# Patient Record
Sex: FEMALE | Race: WHITE | Employment: UNEMPLOYED | URBAN - METROPOLITAN AREA
[De-identification: names, ages, dates, MRNs, and addresses within clinical notes are randomized per-mention and may not be internally consistent; named-entity substitution may affect disease eponyms.]

---

## 2018-02-12 ENCOUNTER — HOSPITAL ENCOUNTER (EMERGENCY)
Facility: CLINIC | Age: 38
Discharge: HOME | End: 2018-02-12
Attending: PHYSICIAN ASSISTANT | Admitting: PHYSICIAN ASSISTANT
Payer: COMMERCIAL

## 2018-02-12 ENCOUNTER — APPOINTMENT (OUTPATIENT)
Dept: GENERAL RADIOLOGY | Facility: CLINIC | Age: 38
End: 2018-02-12
Attending: PHYSICIAN ASSISTANT
Payer: COMMERCIAL

## 2018-02-12 VITALS
OXYGEN SATURATION: 100 % | DIASTOLIC BLOOD PRESSURE: 86 MMHG | WEIGHT: 110 LBS | SYSTOLIC BLOOD PRESSURE: 139 MMHG | BODY MASS INDEX: 18.33 KG/M2 | RESPIRATION RATE: 16 BRPM | HEIGHT: 65 IN | HEART RATE: 110 BPM | TEMPERATURE: 98 F

## 2018-02-12 DIAGNOSIS — M25.511 ACUTE PAIN OF RIGHT SHOULDER: ICD-10-CM

## 2018-02-12 PROCEDURE — 73030 X-RAY EXAM OF SHOULDER: CPT | Mod: RT

## 2018-02-12 PROCEDURE — 99283 EMERGENCY DEPT VISIT LOW MDM: CPT

## 2018-02-12 PROCEDURE — 25000132 ZZH RX MED GY IP 250 OP 250 PS 637: Performed by: PHYSICIAN ASSISTANT

## 2018-02-12 RX ORDER — HYDROCODONE BITARTRATE AND ACETAMINOPHEN 5; 325 MG/1; MG/1
1-2 TABLET ORAL EVERY 4 HOURS PRN
Qty: 8 TABLET | Refills: 0 | Status: SHIPPED | OUTPATIENT
Start: 2018-02-12

## 2018-02-12 RX ORDER — HYDROCODONE BITARTRATE AND ACETAMINOPHEN 5; 325 MG/1; MG/1
2 TABLET ORAL ONCE
Status: COMPLETED | OUTPATIENT
Start: 2018-02-12 | End: 2018-02-12

## 2018-02-12 RX ORDER — DIAZEPAM 5 MG
5 TABLET ORAL ONCE
Status: DISCONTINUED | OUTPATIENT
Start: 2018-02-12 | End: 2018-02-12

## 2018-02-12 RX ADMIN — HYDROCODONE BITARTRATE AND ACETAMINOPHEN 1 TABLET: 5; 325 TABLET ORAL at 17:17

## 2018-02-12 ASSESSMENT — ENCOUNTER SYMPTOMS: ARTHRALGIAS: 1

## 2018-02-12 NOTE — ED AVS SNAPSHOT
Emergency Department    [address] [place] [address]    Phone:  [phone]    Fax:  [phone]                                       Annabella Aguilar   MRN: [id number]    Department:   Emergency Department   Date of Visit:  2/12/2018           After Visit Summary Signature Page     I have received my discharge instructions, and my questions have been answered. I have discussed any challenges I see with this plan with the nurse or doctor.    ..........................................................................................................................................  Patient/Patient Representative Signature      ..........................................................................................................................................  Patient Representative Print Name and Relationship to Patient    ..................................................               ................................................  Date                                            Time    ..........................................................................................................................................  Reviewed by Signature/Title    ...................................................              ..............................................  Date                                                            Time

## 2018-02-12 NOTE — DISCHARGE INSTRUCTIONS
Rest. Ice. Wear sling for comfort, but avoid wearing 24/7 due to risk of frozen shoulder.   Ibuprofen for pain. Norco for more severe pain - avoid driving or drinking alcohol with this medication.   Follow up with orthopedics for further evaluation and probable MRI due to concern for rotator cuff injury.

## 2018-02-12 NOTE — ED AVS SNAPSHOT
Emergency Department    [address] [facility] [address]    Phone:  [phone]    Fax:  [phone]                                       Annabella Aguilar   MRN: [id number]    Department:   Emergency Department   Date of Visit:  2/12/2018           Patient Information     Date Of Birth          1980        Your diagnoses for this visit were:     Acute pain of right shoulder, cannot rule out tendon/ligament injury        You were seen by Mamie Sanchez PA-C.      Follow-up Information     Follow up with  Emergency Department.    Specialty:  EMERGENCY MEDICINE    Why:  If symptoms worsen    Contact information:    [address] [facility] [address] [phone]        Follow up with Orthopaedics, [facility] In 3 days.    Why:  if not improving, for probable MRI    Contact information:    [address] [address] [phone]          Discharge Instructions       Rest. Ice. Wear sling for comfort, but avoid wearing 24/7 due to risk of frozen shoulder.   Ibuprofen for pain. Norco for more severe pain - avoid driving or drinking alcohol with this medication.   Follow up with orthopedics for further evaluation and probable MRI due to concern for rotator cuff injury.         Discharge References/Attachments     SHOULDER SPRAIN  (ENGLISH)      24 Hour Appointment Hotline       To make an appointment at any [facility] clinic, call [phone] ([phone]). If you don't have a family doctor or clinic, we will help you find one. [facility] clinics are conveniently located to serve the needs of you and your family.             Review of your medicines      START taking        Dose / Directions Last dose taken    HYDROcodone-acetaminophen 5-325 MG per tablet   Commonly known as:  NORCO   Dose:  1-2 tablet   Quantity:  8 tablet        Take 1-2 tablets by mouth every 4 hours as needed for moderate to severe pain   Refills:  0          Our records show  that you are taking the medicines listed below. If these are incorrect, please call your family doctor or clinic.        Dose / Directions Last dose taken    EFFEXOR PO        Take by mouth 3 times daily   Refills:  0        IBUPROFEN PO   Dose:  600 mg        Take 600 mg by mouth every 6 hours as needed   Refills:  0        MELATONIN PO        Refills:  0        NEURONTIN PO   Dose:  900 mg        Take 900 mg by mouth 3 times daily   Refills:  0        norgestimate-ethinyl estradiol 0.25-35 MG-MCG per tablet   Commonly known as:  ORTHO-CYCLEN, SPRINTEC   Dose:  1 tablet   Quantity:  3 Package        Take 1 tablet by mouth daily   Refills:  3        oxyCODONE-acetaminophen 5-325 MG per tablet   Commonly known as:  PERCOCET   Dose:  1-2 tablet   Quantity:  15 tablet        Take 1-2 tablets by mouth every 4 hours as needed for pain   Refills:  0        promethazine 25 MG tablet   Commonly known as:  PHENERGAN   Dose:  25 mg   Quantity:  20 tablet        Take 1 tablet (25 mg) by mouth every 6 hours as needed for nausea   Refills:  1        SEROQUEL PO        Refills:  0        UNKNOWN TO PATIENT        Pt. Is taking a stool softener but cannot remember the name of it.   Refills:  0        ZOFRAN PO   Dose:  8 mg        Take 8 mg by mouth every 6 hours as needed   Refills:  0                Prescriptions were sent or printed at these locations (1 Prescription)                   Other Prescriptions                Printed at Department/Unit printer (1 of 1)         HYDROcodone-acetaminophen (NORCO) 5-325 MG per tablet                Procedures and tests performed during your visit     XR Shoulder Right G/E 3 Views      Orders Needing Specimen Collection     None      Pending Results     Date and Time Order Name Status Description    2/12/2018 1656 XR Shoulder Right G/E 3 Views Preliminary             Pending Culture Results     No orders found from 2/10/2018 to 2/13/2018.            Pending Results Instructions     If you  had any lab results that were not finalized at the time of your Discharge, you can call the ED Lab Result RN at [phone]. You will be contacted by this team for any positive Lab results or changes in treatment. The nurses are available 7 days a week from 10A to 6:30P.  You can leave a message 24 hours per day and they will return your call.        Test Results From Your Hospital Stay        2/12/2018  5:13 PM      Narrative     RIGHT SHOULDER THREE OR MORE VIEWS  2/12/2018  5:09 PM     HISTORY: Pain status post lifting.        Impression     IMPRESSION: Nonspecific acromioclavicular joint arthropathy with  irregular subchondral lucencies in the distal clavicular head and  acromion which could represent erosions or subchondral cysts. The  shoulder otherwise appears within normal limits.                Clinical Quality Measure: Blood Pressure Screening     Your blood pressure was checked while you were in the emergency department today. The last reading we obtained was  BP: 139/86 . Please read the guidelines below about what these numbers mean and what you should do about them.  If your systolic blood pressure (the top number) is less than 120 and your diastolic blood pressure (the bottom number) is less than 80, then your blood pressure is normal. There is nothing more that you need to do about it.  If your systolic blood pressure (the top number) is 120-139 or your diastolic blood pressure (the bottom number) is 80-89, your blood pressure may be higher than it should be. You should have your blood pressure rechecked within a year by a primary care provider.  If your systolic blood pressure (the top number) is 140 or greater or your diastolic blood pressure (the bottom number) is 90 or greater, you may have high blood pressure. High blood pressure is treatable, but if left untreated over time it can put you at risk for heart attack, stroke, or kidney failure. You should have your blood pressure rechecked by a  "primary care provider within the next 4 weeks.  If your provider in the emergency department today gave you specific instructions to follow-up with your doctor or provider even sooner than that, you should follow that instruction and not wait for up to 4 weeks for your follow-up visit.        Thank you for choosing [facility]       Thank you for choosing [facility] for your care. Our goal is always to provide you with excellent care. Hearing back from our patients is one way we can continue to improve our services. Please take a few minutes to complete the written survey that you may receive in the mail after you visit with us. Thank you!        [employer] Information     [employer] lets you send messages to your doctor, view your test results, renew your prescriptions, schedule appointments and more. To sign up, go to [email or web address] . Click on \"Log in\" on the left side of the screen, which will take you to the Welcome page. Then click on \"Sign up Now\" on the right side of the page.     You will be asked to enter the access code listed below, as well as some personal information. Please follow the directions to create your username and password.     Your access code is: [email or web address]  Expires: 2018  5:35 PM     Your access code will  in 90 days. If you need help or a new code, please call your [facility] clinic or [phone].        Care EveryWhere ID     This is your Care EveryWhere ID. This could be used by other organizations to access your [facility] medical records  [id number]        Equal Access to Services     MATHEW MONCADA : Hadii emili Ramirez, waaxda lumargarito, qaybta kaalmagenna saleh, fior szymanski . So [facility] [phone].    ATENCIÓN: Si habla español, tiene a johnson disposición servicios gratuitos de asistencia lingüística. Llame al [phone].    We comply with applicable federal civil rights laws and [place] laws. We do not discriminate on the basis of " race, color, national origin, age, disability, sex, sexual orientation, or gender identity.            After Visit Summary       This is your record. Keep this with you and show to your community pharmacist(s) and doctor(s) at your next visit.

## 2018-02-12 NOTE — ED PROVIDER NOTES
"  History     Chief Complaint:  Shoulder pain    HPI   Annabella Aguilar is a 37 year old female who presents to the emergency department for evaluation of shoulder pain. The patient was lifting boxes in her storage room, and felt a \"ripping pain\", like \"something tore\" in her right shoulder. She states that not box fell on her, but she cannot lift her arm anymore due to her pain. The patient denies any numbness or tingling.     Allergies:  Bees      Medications:    Seroquel   Effexor  Sprintec  Melatonin  Phenergan   Gabapentin  Zofran     Past Medical History:    Bowel obstruction  Marital problem     Past Surgical History:    Breast surgery   Lap surgery for right ovarian cyst     Family History:    Endometriosis  Ovary rupture  Skin cancer  Prostate cancer   Diabetes  Alcoholism  Substance abuse  Depression     Social History:  The patient was alone, but has a ride home.  Smoking Status: Current every day smoker, 0.25 PPD  Smokeless Tobacco: Never  Alcohol Use: Yes   Marital Status:        Review of Systems   Musculoskeletal: Positive for arthralgias.       Physical Exam   Patient Vitals for the past 24 hrs:   BP Temp Temp src Pulse Resp SpO2 Height Weight   02/12/18 1653 139/86 98  F (36.7  C) Temporal 110 16 100 % 1.651 m (5' 5\") 49.9 kg (110 lb)        Physical Exam  Nursing note and vitals reviewed.     GENERAL: Alert, mild distress.   HEENT: Normal conjunctiva. No scleral icterus. MMM.  NECK: Supple. Normal ROM. No cervical midline tenderness.   CARDIAC: Regular rate and rhythm. Intact distal radial pulses 2+. Capillary refill less than 2 seconds.   PULMONARY: CTAB.    NEURO: Alert and oriented. Non-focal. Sensation intact to light touch distally in right upper extremity.   MUSCULOSKELETAL:   Right shoulder: no obvious deformity. Mild tenderness over lateral shoulder, limited range of motion due to pain; weakness with flexion and extension against resistance. Internal and external rotation intact. "   Right elbow and wrist without deformity, swelling or tenderness, normal ROM.   SKIN: Skin is warm and dry. No rashes. No pallor or jaundice.   PSYCH: Normal affect and mood.     Emergency Department Course     Imaging:  Radiology findings were communicated with the patient who voiced understanding of the findings.    XR Shoulder Right G/E 3 views:  IMPRESSION: Nonspecific acromioclavicular joint arthropathy with  irregular subchondral lucencies in the distal clavicular head and  acromion which could represent erosions or subchondral cysts. The  shoulder otherwise appears within normal limits.  Report per radiology     Interventions:  1717 - Norco 5-325mg per tablet, 1 tablet PO      Emergency Department Course:  Nursing notes and vitals reviewed.  The patient was sent for a XR Shoulder Right, G/E 3 views while in the emergency department, results above.     1656: I performed an exam of the patient as documented above.  1734: Patient rechecked and updated.      The patient's arm was placed in a sling while in the emergency department.    Findings and plan explained to the Patient. Patient discharged home with instructions regarding supportive care, medications, and reasons to return. The importance of close follow-up was reviewed. The patient was prescribed Norco.  I personally reviewed the imaging results with the Patient and answered all related questions prior to discharge.     Impression & Plan      Medical Decision Making:  Annabella Aguilar is a 37 year old female who presented to the ED with an injury to her right shoulder. X-rays of the right shoulder are negative for acute fracture or malalignment. She does have non specific AC joint arthropathy with some lucencies in the distal clavicular head that may represent erosions or subchondral cysts. I reviewed this with patient and do not feel this is related to today's complaint. Based on her description of pain and weakness on exam, I am concerned for possible  ligament/tendon injury and feel she requires follow up with orthopedics for probable MRI. Contact info provided. Will provide sling for comfort; though I did review with her the increased risk of adhesive capsulitis if worn for extended periods and to try and mix in stretches/exercises. RICE treatment reviewed, ibuprofen for pain and will provide norco for more severe pain. Advised to avoid alcohol or driving if taking this medication. Reviewed reasons to return to ED, including worsening symptoms or any new concerns. The patient was in agreement with plan and discharged in satisfactory condition with all questions answered.      Diagnosis:    ICD-10-CM    1. Acute pain of right shoulder, cannot rule out tendon/ligament injury M25.511      Disposition:  Discharged to home.    Discharge Medications:  New Prescriptions    HYDROCODONE-ACETAMINOPHEN (NORCO) 5-325 MG PER TABLET    Take 1-2 tablets by mouth every 4 hours as needed for moderate to severe pain       Scribe Disclosure:  Maru DE SOUZA, am serving as a scribe at 4:56 PM on 2/12/2018 to document services personally performed by Mamie Sanchez PA-C based on my observations and the provider's statements to me.   2/12/2018    EMERGENCY DEPARTMENT       Mamie Sanchez PA-C  02/14/18 0924